# Patient Record
Sex: FEMALE | Race: WHITE | NOT HISPANIC OR LATINO | Employment: UNEMPLOYED | URBAN - NONMETROPOLITAN AREA
[De-identification: names, ages, dates, MRNs, and addresses within clinical notes are randomized per-mention and may not be internally consistent; named-entity substitution may affect disease eponyms.]

---

## 2022-01-14 ENCOUNTER — HOSPITAL ENCOUNTER (EMERGENCY)
Facility: HOSPITAL | Age: 22
Discharge: HOME | End: 2022-01-14
Attending: EMERGENCY MEDICINE | Admitting: EMERGENCY MEDICINE

## 2022-01-14 ENCOUNTER — APPOINTMENT (OUTPATIENT)
Dept: GENERAL RADIOLOGY | Facility: HOSPITAL | Age: 22
End: 2022-01-14

## 2022-01-14 VITALS
WEIGHT: 242.8 LBS | DIASTOLIC BLOOD PRESSURE: 94 MMHG | TEMPERATURE: 98.4 F | BODY MASS INDEX: 40.45 KG/M2 | SYSTOLIC BLOOD PRESSURE: 142 MMHG | RESPIRATION RATE: 18 BRPM | OXYGEN SATURATION: 97 % | HEIGHT: 65 IN | HEART RATE: 93 BPM

## 2022-01-14 DIAGNOSIS — R07.9 CHEST PAIN, UNSPECIFIED TYPE: Primary | ICD-10-CM

## 2022-01-14 LAB
ALBUMIN SERPL-MCNC: 5 G/DL (ref 3.5–5.2)
ALBUMIN/GLOB SERPL: 1.7 G/DL
ALP SERPL-CCNC: 96 U/L (ref 39–117)
ALT SERPL W P-5'-P-CCNC: 119 U/L (ref 1–33)
ANION GAP SERPL CALCULATED.3IONS-SCNC: 13.5 MMOL/L (ref 5–15)
AST SERPL-CCNC: 75 U/L (ref 1–32)
BASOPHILS # BLD AUTO: 0.06 10*3/MM3 (ref 0–0.2)
BASOPHILS NFR BLD AUTO: 0.7 % (ref 0–1.5)
BILIRUB SERPL-MCNC: 0.3 MG/DL (ref 0–1.2)
BUN SERPL-MCNC: 12 MG/DL (ref 6–20)
BUN/CREAT SERPL: 18.2 (ref 7–25)
CALCIUM SPEC-SCNC: 10.2 MG/DL (ref 8.6–10.5)
CHLORIDE SERPL-SCNC: 103 MMOL/L (ref 98–107)
CO2 SERPL-SCNC: 20.5 MMOL/L (ref 22–29)
CREAT SERPL-MCNC: 0.66 MG/DL (ref 0.57–1)
D DIMER PPP FEU-MCNC: 0.23 MCGFEU/ML (ref 0–0.57)
DEPRECATED RDW RBC AUTO: 40.4 FL (ref 37–54)
EOSINOPHIL # BLD AUTO: 0.11 10*3/MM3 (ref 0–0.4)
EOSINOPHIL NFR BLD AUTO: 1.2 % (ref 0.3–6.2)
ERYTHROCYTE [DISTWIDTH] IN BLOOD BY AUTOMATED COUNT: 14.8 % (ref 12.3–15.4)
GFR SERPL CREATININE-BSD FRML MDRD: 111 ML/MIN/1.73
GLOBULIN UR ELPH-MCNC: 2.9 GM/DL
GLUCOSE SERPL-MCNC: 118 MG/DL (ref 65–99)
HCG SERPL QL: NEGATIVE
HCT VFR BLD AUTO: 43.7 % (ref 34–46.6)
HGB BLD-MCNC: 15 G/DL (ref 12–15.9)
HOLD SPECIMEN: NORMAL
IMM GRANULOCYTES # BLD AUTO: 0.03 10*3/MM3 (ref 0–0.05)
IMM GRANULOCYTES NFR BLD AUTO: 0.3 % (ref 0–0.5)
LIPASE SERPL-CCNC: 40 U/L (ref 13–60)
LYMPHOCYTES # BLD AUTO: 2.99 10*3/MM3 (ref 0.7–3.1)
LYMPHOCYTES NFR BLD AUTO: 33 % (ref 19.6–45.3)
MCH RBC QN AUTO: 26.2 PG (ref 26.6–33)
MCHC RBC AUTO-ENTMCNC: 34.3 G/DL (ref 31.5–35.7)
MCV RBC AUTO: 76.3 FL (ref 79–97)
MONOCYTES # BLD AUTO: 0.5 10*3/MM3 (ref 0.1–0.9)
MONOCYTES NFR BLD AUTO: 5.5 % (ref 5–12)
NEUTROPHILS NFR BLD AUTO: 5.38 10*3/MM3 (ref 1.7–7)
NEUTROPHILS NFR BLD AUTO: 59.3 % (ref 42.7–76)
NRBC BLD AUTO-RTO: 0 /100 WBC (ref 0–0.2)
PLATELET # BLD AUTO: 286 10*3/MM3 (ref 140–450)
PMV BLD AUTO: 9.4 FL (ref 6–12)
POTASSIUM SERPL-SCNC: 3.8 MMOL/L (ref 3.5–5.2)
PROT SERPL-MCNC: 7.9 G/DL (ref 6–8.5)
RBC # BLD AUTO: 5.73 10*6/MM3 (ref 3.77–5.28)
SODIUM SERPL-SCNC: 137 MMOL/L (ref 136–145)
TROPONIN T SERPL-MCNC: <0.01 NG/ML (ref 0–0.03)
WBC NRBC COR # BLD: 9.07 10*3/MM3 (ref 3.4–10.8)
WHOLE BLOOD HOLD SPECIMEN: NORMAL
WHOLE BLOOD HOLD SPECIMEN: NORMAL

## 2022-01-14 PROCEDURE — 96374 THER/PROPH/DIAG INJ IV PUSH: CPT

## 2022-01-14 PROCEDURE — 80053 COMPREHEN METABOLIC PANEL: CPT | Performed by: EMERGENCY MEDICINE

## 2022-01-14 PROCEDURE — 25010000002 ONDANSETRON PER 1 MG: Performed by: PHYSICIAN ASSISTANT

## 2022-01-14 PROCEDURE — 84703 CHORIONIC GONADOTROPIN ASSAY: CPT | Performed by: EMERGENCY MEDICINE

## 2022-01-14 PROCEDURE — 85025 COMPLETE CBC W/AUTO DIFF WBC: CPT | Performed by: EMERGENCY MEDICINE

## 2022-01-14 PROCEDURE — 84484 ASSAY OF TROPONIN QUANT: CPT | Performed by: EMERGENCY MEDICINE

## 2022-01-14 PROCEDURE — 83690 ASSAY OF LIPASE: CPT | Performed by: PHYSICIAN ASSISTANT

## 2022-01-14 PROCEDURE — 99283 EMERGENCY DEPT VISIT LOW MDM: CPT

## 2022-01-14 PROCEDURE — 71045 X-RAY EXAM CHEST 1 VIEW: CPT

## 2022-01-14 PROCEDURE — 85379 FIBRIN DEGRADATION QUANT: CPT | Performed by: EMERGENCY MEDICINE

## 2022-01-14 PROCEDURE — 93005 ELECTROCARDIOGRAM TRACING: CPT | Performed by: EMERGENCY MEDICINE

## 2022-01-14 RX ORDER — PANTOPRAZOLE SODIUM 40 MG/1
40 TABLET, DELAYED RELEASE ORAL ONCE
Status: COMPLETED | OUTPATIENT
Start: 2022-01-14 | End: 2022-01-14

## 2022-01-14 RX ORDER — ONDANSETRON 2 MG/ML
4 INJECTION INTRAMUSCULAR; INTRAVENOUS ONCE
Status: COMPLETED | OUTPATIENT
Start: 2022-01-14 | End: 2022-01-14

## 2022-01-14 RX ORDER — PANTOPRAZOLE SODIUM 20 MG/1
20 TABLET, DELAYED RELEASE ORAL DAILY
Qty: 30 TABLET | Refills: 0 | Status: SHIPPED | OUTPATIENT
Start: 2022-01-14

## 2022-01-14 RX ORDER — SODIUM CHLORIDE 0.9 % (FLUSH) 0.9 %
10 SYRINGE (ML) INJECTION AS NEEDED
Status: DISCONTINUED | OUTPATIENT
Start: 2022-01-14 | End: 2022-01-15 | Stop reason: HOSPADM

## 2022-01-14 RX ORDER — DICYCLOMINE HYDROCHLORIDE 10 MG/1
20 CAPSULE ORAL ONCE
Status: COMPLETED | OUTPATIENT
Start: 2022-01-14 | End: 2022-01-14

## 2022-01-14 RX ADMIN — PANTOPRAZOLE SODIUM 40 MG: 40 TABLET, DELAYED RELEASE ORAL at 23:11

## 2022-01-14 RX ADMIN — LIDOCAINE HYDROCHLORIDE: 20 SOLUTION ORAL; TOPICAL at 22:22

## 2022-01-14 RX ADMIN — ONDANSETRON 4 MG: 2 INJECTION INTRAMUSCULAR; INTRAVENOUS at 22:22

## 2022-01-14 RX ADMIN — DICYCLOMINE HYDROCHLORIDE 20 MG: 10 CAPSULE ORAL at 23:11

## 2022-01-15 NOTE — ED PROVIDER NOTES
"Subjective   Patient is a 21-year-old female with reported history of hypertension presenting to the ER for evaluation of chest pain.  Patient's mother is at bedside.  Patient's mother states that the patient does live in assisted living throughout the week and stays with mother on the weekends.  Today while she was riding in the car patient began having sharp stabbing pains in the middle of her chest that radiated to both sides.  She states she broke out in a cold sweat at that time.  She states she has had pain like that before but is never broken onto a sweat.  She denies any recent dizziness, syncopal episodes, hemoptysis, productive cough, fever, chills, normal pain, dysuria, hematuria, leg pain or swelling, or any other symptoms.  Patient does have positive family history of cardiovascular disease, patient's father had an MI at 48.  She denies any tobacco use.  She takes a Depo shot, no other oral contraceptives or hormones.          Review of Systems   Constitutional: Negative for chills and fever.   HENT: Negative.    Eyes: Negative.    Respiratory: Negative for cough and shortness of breath.    Cardiovascular: Positive for chest pain.   Gastrointestinal: Negative.    Genitourinary: Negative.    Musculoskeletal: Negative.    Skin: Negative.    Allergic/Immunologic: Negative for immunocompromised state.   Neurological: Negative.    Psychiatric/Behavioral: Negative.        No past medical history on file.    No Known Allergies    No past surgical history on file.    No family history on file.    Social History     Socioeconomic History   • Marital status: Single           Objective   Physical Exam  Vitals and nursing note reviewed.     BP (!) 170/103 (BP Location: Right arm, Patient Position: Sitting)   Pulse 103   Temp 98.4 °F (36.9 °C) (Oral)   Resp 17   Ht 165.1 cm (65\")   Wt 110 kg (242 lb 12.8 oz)   SpO2 99%   BMI 40.40 kg/m²     GEN: No acute distress, sitting upright in stretcher.  Awake and " alert. Does not appear toxic.   Head: Normocephalic, atraumatic  Eyes: EOM intact  ENT: Mask in place per protocol   Chest: Mild anterior chest wall tenderness  Cardiovascular: Tachycardia, regular rhythm   Lungs: Clear to auscultation bilaterally   Abdomen: Soft, nontender, nondistended, no peritoneal signs, no focal guarding  Extremities: No edema, normal appearance, full ROM  Neuro: GCS 15  Psych: Mood and affect are appropriate      Procedures           ED Course  ED Course as of 01/14/22 2308 Fri Jan 14, 2022 2156 EKG interpreted by me reveals sinus rhythm with rate of 97 bpm.  No acute ST segment or T wave changes.  This is a normal-appearing EKG. [TB]   2201 HCG Qualitative: Negative [LA]   2201 Troponin T: <0.010 [LA]   2201 Glucose(!): 118 [LA]   2201 BUN: 12 [LA]   2201 Creatinine: 0.66 [LA]   2201 Sodium: 137 [LA]   2201 Potassium: 3.8 [LA]   2201 Chloride: 103 [LA]   2201 CO2(!): 20.5 [LA]   2201 Calcium: 10.2 [LA]   2201 Total Protein: 7.9 [LA]   2201 Albumin: 5.00 [LA]   2201 ALT (SGPT)(!): 119 [LA]   2201 AST (SGOT)(!): 75 [LA]   2201 Alkaline Phosphatase: 96 [LA]   2201 Total Bilirubin: 0.3 [LA]   2201 eGFR Non  Am: 111 [LA]   2201 Globulin: 2.9 [LA]   2201 A/G Ratio: 1.7 [LA]   2201 BUN/Creatinine Ratio: 18.2 [LA]   2201 Anion Gap: 13.5 [LA]   2201 Troponin T: <0.010 [LA]   2201 WBC: 9.07 [LA]   2201 RBC(!): 5.73 [LA]   2201 Hemoglobin: 15.0 [LA]   2201 Platelets: 286 [LA]   2201 Lipase: 40 [LA]   2237 Updated patient.  Her blood pressure is improved significantly on the cardiac monitor, systolic is in the 130s.  Heart rate is in the 80s.  She still complaining of pain and points to her left upper abdomen.  No significant guarding.  Patient states she has had a cholecystectomy. Discussed with Dr. Riggins.  Labs are stable, no significant infection process.  Will give Protonix and Bentyl [LA]   5012 Reviewed chest x-ray with Dr. Riggins did not see any acute cardiopulmonary abnormality.  [LA]   2253 HEART score is 2, low risk. [LA]   2257 Just updated the patient and family.  Patient just went to the bathroom and urinated, will dc UA. Will hand patient care to Dr. Riggins pending d-dimer. [LA]      ED Course User Index  [LA] Lizeth Guzmán PA-C  [TB] Marianela Riggins MD                                                 MDM  Number of Diagnoses or Management Options  Diagnosis management comments: Patient is hypertensive and tachycardic.  She is afebrile.  She is in no acute distress.  She has no hypoxia.  Mother has prepackaged medication that she takes throughout the week at bedside.  Differential could include cardiac dysrhythmia, esophageal spasm, esophagitis, pneumonia, bronchitis, pneumothorax, or any other concerns.  Lower concern for pulmonary embolism but given she is on the Depo shot and is tachycardic, will obtain D-dimer, troponin, basic labs, chest x-ray, EKG.  Will give GI cocktail as well.      Final diagnoses:   Chest pain, unspecified type       ED Disposition  ED Disposition     ED Disposition Condition Comment    Discharge Stable           No follow-up provider specified.       Medication List      No changes were made to your prescriptions during this visit.         D-dimer negative, will discharge with outpatient follow-up.     Marianela Riggins MD  01/14/22 2446